# Patient Record
Sex: FEMALE | Race: WHITE | NOT HISPANIC OR LATINO
[De-identification: names, ages, dates, MRNs, and addresses within clinical notes are randomized per-mention and may not be internally consistent; named-entity substitution may affect disease eponyms.]

---

## 2021-05-18 PROBLEM — Z00.00 ENCOUNTER FOR PREVENTIVE HEALTH EXAMINATION: Status: ACTIVE | Noted: 2021-05-18

## 2021-05-27 ENCOUNTER — APPOINTMENT (OUTPATIENT)
Dept: GYNECOLOGIC ONCOLOGY | Facility: CLINIC | Age: 56
End: 2021-05-27
Payer: COMMERCIAL

## 2021-05-27 VITALS
SYSTOLIC BLOOD PRESSURE: 177 MMHG | DIASTOLIC BLOOD PRESSURE: 110 MMHG | HEIGHT: 63 IN | HEART RATE: 86 BPM | OXYGEN SATURATION: 98 % | WEIGHT: 190 LBS | BODY MASS INDEX: 33.66 KG/M2

## 2021-05-27 DIAGNOSIS — I10 ESSENTIAL (PRIMARY) HYPERTENSION: ICD-10-CM

## 2021-05-27 DIAGNOSIS — C73 MALIGNANT NEOPLASM OF THYROID GLAND: ICD-10-CM

## 2021-05-27 DIAGNOSIS — Z80.3 FAMILY HISTORY OF MALIGNANT NEOPLASM OF BREAST: ICD-10-CM

## 2021-05-27 DIAGNOSIS — Z83.3 FAMILY HISTORY OF DIABETES MELLITUS: ICD-10-CM

## 2021-05-27 DIAGNOSIS — E78.5 HYPERLIPIDEMIA, UNSPECIFIED: ICD-10-CM

## 2021-05-27 DIAGNOSIS — Z78.9 OTHER SPECIFIED HEALTH STATUS: ICD-10-CM

## 2021-05-27 DIAGNOSIS — Z85.42 PERSONAL HISTORY OF MALIGNANT NEOPLASM OF OTHER PARTS OF UTERUS: ICD-10-CM

## 2021-05-27 PROCEDURE — 99203 OFFICE O/P NEW LOW 30 MIN: CPT | Mod: 25

## 2021-05-27 PROCEDURE — 99072 ADDL SUPL MATRL&STAF TM PHE: CPT

## 2021-05-27 PROCEDURE — 57420 EXAM OF VAGINA W/SCOPE: CPT | Mod: 59

## 2021-05-27 RX ORDER — LOSARTAN POTASSIUM 100 MG/1
TABLET, FILM COATED ORAL
Refills: 0 | Status: ACTIVE | COMMUNITY

## 2021-05-27 RX ORDER — ROSUVASTATIN CALCIUM 5 MG/1
TABLET, FILM COATED ORAL
Refills: 0 | Status: ACTIVE | COMMUNITY

## 2021-05-27 RX ORDER — LEVOTHYROXINE SODIUM 137 UG/1
TABLET ORAL
Refills: 0 | Status: ACTIVE | COMMUNITY

## 2021-05-28 NOTE — HISTORY OF PRESENT ILLNESS
[FreeTextEntry1] : 54 y/o  via  x 2 non-smoking female, LMP at age 46 being referred by NP Ludmila Andrews for abnormal vaginal PAP. 21 PAP HPV mRNA positive, negative for cell changes. Patient is a previous patient of ours with stage 1A grade 1 uterine cancer dx 2012 s/p RA TLH BSO Cystoscopy with no residual tumor in hysterectomy specimen. Patient completed 5 year follow up with our office. Denies vaginal bleeding/discharge, abdominal pain/bloating/distension, pelvis discomfort, changes in normal bowel/urinary habits, nausea/vomiting, unintentional weight loss/gain, and all other associated signs and symptoms. She is currently sexually active, denies pain/bleeding. Patient reports last HIV testing done . She denies chest pain, palpitations, shortness of breath, headache and blurry vision. \par \par Of note patient is BRCA negative. \par \par Health Screening:\par Last Mammogram: 21; results pending ,2019 was normal as per patient. \par Last Colonoscopy: 5-6 years ago, normal as per patient \par Last Dexa: 5 years ago, normal as per patient. Patient has an appointment next week. \par

## 2021-05-28 NOTE — PROCEDURE
[Colposcopy] : colposcopy [Abnormal Pap] : an abnormal pap smear [HPV high risk] : PCR positive for high risk HPV [Patient] : the patient [Verbal Consent] : verbal consent was obtained prior to the procedure and is detailed in the patient's record [Normal Staining] : normal staining [No Abnormalities] : no abnormalities seen [No Complications] : none [Tolerated Well] : the patient tolerated the procedure well [Post procedure instructions and information given] : post procedure instructions and information given and reviewed with patient. [Vaginal Pap Performed] : no vaginal pap smear was performed

## 2021-05-28 NOTE — PHYSICAL EXAM
[Absent] : Adnexa(ae): Absent [Normal] : Bimanual Exam: Normal [de-identified] : Patient was interviewed and examined with chaperone present. Name of Chaperone: Britni White PA-C

## 2021-05-28 NOTE — CHIEF COMPLAINT
[FreeTextEntry1] : [place] Office\par \par [facility] [phone] at [address] [place] [address]

## 2021-05-28 NOTE — ASSESSMENT
[FreeTextEntry1] : 54 y/o with hx of uterine cancer in 2012 followed under our care, now with HPV + vaginal PAP. Colposcopy done in office today normal. \par \par

## 2021-05-28 NOTE — END OF VISIT
[FreeTextEntry3] : This note accurately reflects the work and decisions made by me.\par Written by Britni White PA-C acting as a scribe for Dr. Mitchell Melo.

## 2021-12-15 ENCOUNTER — APPOINTMENT (OUTPATIENT)
Dept: GYNECOLOGIC ONCOLOGY | Facility: CLINIC | Age: 56
End: 2021-12-15

## 2022-01-20 ENCOUNTER — APPOINTMENT (OUTPATIENT)
Dept: GYNECOLOGIC ONCOLOGY | Facility: CLINIC | Age: 57
End: 2022-01-20
Payer: COMMERCIAL

## 2022-02-02 ENCOUNTER — LABORATORY RESULT (OUTPATIENT)
Age: 57
End: 2022-02-02

## 2022-02-02 ENCOUNTER — APPOINTMENT (OUTPATIENT)
Dept: GYNECOLOGIC ONCOLOGY | Facility: CLINIC | Age: 57
End: 2022-02-02
Payer: COMMERCIAL

## 2022-02-02 VITALS
RESPIRATION RATE: 16 BRPM | HEIGHT: 63 IN | OXYGEN SATURATION: 96 % | SYSTOLIC BLOOD PRESSURE: 194 MMHG | WEIGHT: 190 LBS | BODY MASS INDEX: 33.66 KG/M2 | HEART RATE: 108 BPM | DIASTOLIC BLOOD PRESSURE: 126 MMHG

## 2022-02-02 DIAGNOSIS — R87.811 VAGINAL HIGH RISK HUMAN PAPILLOMAVIRUS (HPV) DNA TEST POSITIVE: ICD-10-CM

## 2022-02-02 PROCEDURE — 99213 OFFICE O/P EST LOW 20 MIN: CPT

## 2022-02-02 RX ORDER — CITALOPRAM 10 MG/1
TABLET, FILM COATED ORAL
Refills: 0 | Status: ACTIVE | COMMUNITY

## 2022-02-08 LAB — CYTOLOGY CVX/VAG DOC THIN PREP: ABNORMAL

## 2022-02-09 PROBLEM — R87.811 VAGINAL HIGH RISK HUMAN PAPILLOMAVIRUS (HPV) DNA TEST POSITIVE: Status: ACTIVE | Noted: 2021-05-27

## 2022-02-09 LAB — HPV HIGH+LOW RISK DNA PNL CVX: DETECTED

## 2022-02-09 NOTE — END OF VISIT
[FreeTextEntry3] : Written by dAe Barnett, acting as a scribe for Dr. Mitchell Melo \par This note accurately reflects the work and decisions made by me.

## 2022-02-09 NOTE — HISTORY OF PRESENT ILLNESS
[FreeTextEntry1] : This 55 y/o with a hx of 4/27/21  stage 1A grade 1 uterine cancer dx 1/2012 s/p RA TLH BSO Cystoscopy with no residual tumor in hysterectomy specimen. Patient was referred back to my office in May 2021 for abnormal vaginal pap smear 4/27/21 PAP HPV mRNA positive, negative for cell changes. I performed a colposcopy which was wnl. She returns to the office today for a follow up vaginal pap smear. She denies any pain or VB. Patient states she feels well from a gynecological stand point.

## 2022-02-09 NOTE — ASSESSMENT
[FreeTextEntry1] : Patient is doing well from a gynecological stand point. She will return in September for a vaginal pap smear. Patient stated she understood and agreed to comply.

## 2022-02-09 NOTE — REASON FOR VISIT
[FreeTextEntry1] : [place] Location \par \par [facility] [place]. [phone]\par [address] [place] [id number] \par \par -Referred 5/2021 for Abnormal VPAP\par -Hx of Stage IA grade 1 uterine cancer. \par -S/p RA TLH BSO Cystoscopy with no residual tumor in hysterectomy specimen.\par -Non-smoker\par -Lpap 4/27/21 PAP HPV mRNA positive, negative for cell changes.\par -Colpo 5/27/21 wnl. \par -F/u Vpap.